# Patient Record
Sex: MALE | Race: OTHER | NOT HISPANIC OR LATINO | URBAN - METROPOLITAN AREA
[De-identification: names, ages, dates, MRNs, and addresses within clinical notes are randomized per-mention and may not be internally consistent; named-entity substitution may affect disease eponyms.]

---

## 2018-11-04 ENCOUNTER — EMERGENCY (EMERGENCY)
Facility: HOSPITAL | Age: 43
LOS: 1 days | Discharge: HOME | End: 2018-11-04
Admitting: EMERGENCY MEDICINE
Payer: COMMERCIAL

## 2018-11-04 VITALS
WEIGHT: 266.32 LBS | RESPIRATION RATE: 18 BRPM | SYSTOLIC BLOOD PRESSURE: 111 MMHG | HEART RATE: 72 BPM | TEMPERATURE: 98 F | DIASTOLIC BLOOD PRESSURE: 66 MMHG
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
## 2018-11-04 DIAGNOSIS — M79.674 PAIN IN RIGHT TOE(S): ICD-10-CM

## 2018-11-04 DIAGNOSIS — S92.421A DISPLACED FRACTURE OF DISTAL PHALANX OF RIGHT GREAT TOE, INITIAL ENCOUNTER FOR CLOSED FRACTURE: ICD-10-CM
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
## 2018-11-04 PROCEDURE — 99282 EMERGENCY DEPT VISIT SF MDM: CPT

## 2018-11-04 PROCEDURE — 99284 EMERGENCY DEPT VISIT MOD MDM: CPT

## 2018-11-04 NOTE — ED ADULT NURSE NOTE - OBJECTIVE STATEMENT
The pt is a 44 y/o male who came in to ED for evaluation of right toe pain. Pt reports that a piece of furniture fell on his foot today. Pt states that he went to an urgent care and  was told that he had a fracture right great toe in multiple places and that he "needed an orthopedic consult" Pt brought CD of x-ray, sent to radiology to be uploaded.

## 2018-11-04 NOTE — ED PROVIDER NOTE - PHYSICAL EXAMINATION
+ superficial abrasion to distal right great toe. no active bleeding. mild bruising present and tenderness to distal toe. posterior splint in place and removed. distal NVI. remainder of foot and ankle non tender.

## 2018-11-04 NOTE — ED PROVIDER NOTE - MEDICAL DECISION MAKING DETAILS
toe fx. x-ray reviewed and + fx to distal right great toe. neurovascular intact.  abrasion present and bandage intact. will give hard sole shoe and f/u with podiatry. return precautions d/w pt.

## 2018-11-04 NOTE — ED PROVIDER NOTE - OBJECTIVE STATEMENT
44 y/o male with no sig PMHx c/o right great toe pain x 1 day. Pt states accidentally dropped wooden board on foot yesterday. + pain to right great toe with abrasion. pt notes seen at urgent care prior to arrival and + fx and sent for further eval. pt able to bear wt. no numbness or tingling. no further complaints.

## 2020-03-04 NOTE — ED ADULT NURSE NOTE - CAS EDN DISCHARGE ASSESSMENT
Received in basket from [email or web address]  advising of approval for Left L4 & Left L5 TFESIs for one unit/DOS. Will  Inform Nursing. Alert and oriented to person, place and time

## 2025-05-23 NOTE — ED ADULT NURSE NOTE - BREATHING, MLM
[facility] provides services at a reduced cost to those who are determined to be eligible through [facility]’s financial assistance program. Information regarding [facility]’s financial assistance program can be found by going to [email or web address] or by calling [phone].
Spontaneous, unlabored and symmetrical